# Patient Record
Sex: FEMALE | Race: WHITE | NOT HISPANIC OR LATINO | Employment: OTHER | URBAN - METROPOLITAN AREA
[De-identification: names, ages, dates, MRNs, and addresses within clinical notes are randomized per-mention and may not be internally consistent; named-entity substitution may affect disease eponyms.]

---

## 2017-01-04 ENCOUNTER — TELEPHONE (OUTPATIENT)
Dept: VASCULAR LAB | Facility: MEDICAL CENTER | Age: 77
End: 2017-01-04

## 2017-01-04 ENCOUNTER — OFFICE VISIT (OUTPATIENT)
Dept: CARDIOLOGY | Facility: MEDICAL CENTER | Age: 77
End: 2017-01-04
Payer: MEDICARE

## 2017-01-04 VITALS
OXYGEN SATURATION: 94 % | SYSTOLIC BLOOD PRESSURE: 170 MMHG | BODY MASS INDEX: 33.75 KG/M2 | HEIGHT: 66 IN | DIASTOLIC BLOOD PRESSURE: 76 MMHG | HEART RATE: 65 BPM | WEIGHT: 210 LBS

## 2017-01-04 DIAGNOSIS — I48.0 PAF (PAROXYSMAL ATRIAL FIBRILLATION) (HCC): ICD-10-CM

## 2017-01-04 DIAGNOSIS — I48.91 ATRIAL FIBRILLATION, UNSPECIFIED TYPE (HCC): ICD-10-CM

## 2017-01-04 DIAGNOSIS — I10 ESSENTIAL HYPERTENSION: ICD-10-CM

## 2017-01-04 LAB — EKG IMPRESSION: NORMAL

## 2017-01-04 PROCEDURE — 1036F TOBACCO NON-USER: CPT | Performed by: INTERNAL MEDICINE

## 2017-01-04 PROCEDURE — G8427 DOCREV CUR MEDS BY ELIG CLIN: HCPCS | Performed by: INTERNAL MEDICINE

## 2017-01-04 PROCEDURE — 99214 OFFICE O/P EST MOD 30 MIN: CPT | Performed by: INTERNAL MEDICINE

## 2017-01-04 PROCEDURE — G8419 CALC BMI OUT NRM PARAM NOF/U: HCPCS | Performed by: INTERNAL MEDICINE

## 2017-01-04 PROCEDURE — 93000 ELECTROCARDIOGRAM COMPLETE: CPT | Performed by: INTERNAL MEDICINE

## 2017-01-04 ASSESSMENT — ENCOUNTER SYMPTOMS: PALPITATIONS: 1

## 2017-01-04 NOTE — MR AVS SNAPSHOT
"        Radha Valentino Cocergine   2017 3:20 PM   Office Visit   MRN: [id number]    Department:  [facility]   Dept Phone:  [phone]    Description:  Female : 1940   Provider:  Jairo Loco M.D.           Reason for Visit     Follow-Up           Allergies as of 2017     Allergen Noted Reactions    Kdc:Fentanyl 2009       States drops the b/p very low  PER PT \"NOT TO BE ADMINISTERED-NOT ANY FENTANYL\"    Morphine 2009   Unspecified    Drops blood pressure   PER PT \"NOT TO BE ADMINISTERED\"    Ciprofloxacin 2015   Vomiting    Codeine 2009   Nausea    \"hyper\"    Erythromycin 2014   Vomiting    Penicillins 2009   Rash    Sulfa Drugs 2011   Vomiting, Nausea    RXN=since age 18      You were diagnosed with     PAF (paroxysmal atrial fibrillation) (Formerly [facility])   [113628]       Essential hypertension   [0414755]         Vital Signs     Blood Pressure Pulse Height Weight Body Mass Index Oxygen Saturation    170/76 mmHg 65 1.676 m (5' 5.98\") 95.255 kg (210 lb) 33.91 kg/m2 94%    Last Menstrual Period Smoking Status                1975 Former Smoker          Basic Information     Date Of Birth Sex Race Ethnicity Preferred Language    1940 Female White Non- English      Your appointments     2017  3:20 PM   FOLLOW UP with Jairo Loco M.D.   [facility] (--)    [address], [address]  [place] [id number] [phone]            2017  2:00 PM   Access New To You with Andrea Fernández M.D.   [facility] [address]    [address]  [address]  [place] [id number] [phone]            2017  1:15 PM   FOLLOW UP with Landen Esquivel M.D.   [facility] (--)    [address], [address]  [place] [id number] [phone]              Problem List              ICD-10-CM Priority Class Noted - Resolved    Edema R60.9 High  " 11/15/2011 - Present    Hypothyroidism E03.9   11/15/2011 - Present    Hepatitis-Autoimmune K75.9   2012 - Present    Dyslipidemia E78.5   2012 - Present    Osteoarthritis M19.90   2015 - Present    Obesity (BMI 30-39.9) E66.9   2015 - Present    Essential hypertension I10   10/7/2015 - Present    PAF (paroxysmal atrial fibrillation) (HCC) I48.0   10/21/2015 - Present    Midline low back pain without sciatica M54.5   2015 - Present    Chronic anticoagulation Z79.01   2016 - Present    Lumbar stenosis M48.06   2016 - Present      Health Maintenance        Date Due Completion Dates    PAP SMEAR 1959 ---    IMM ZOSTER VACCINE 1998 ---    IMM PNEUMOCOCCAL 65+ (ADULT) LOW/MEDIUM RISK SERIES (2 of 2 - PPSV23) 11/3/2016 11/3/2015    MAMMOGRAM 3/4/2017 3/4/2016, 2016, 2014, 2011, 2011, 2009, 2009    COLONOSCOPY 2018 (Done)    Override on 2008: Done    BONE DENSITY 2021, 2012, 2009    IMM DTaP/Tdap/Td Vaccine (2 - Td) 2026            Results     EKG      Component    Report    [facility]    Test Date:  2017  Pt Name:    MECCA AMAYA               Department: [facility]  MRN:        [id number]                      Room:  Gender:     F                            Technician: KW  :        1940                   Requested By:JAIRO MORTON  Order #:    [id number]                    Reading MD: Jairo Morton MD    Measurements  Intervals                                Axis  Rate:       54                           P:          67  FL:         172                          QRS:        53  QRSD:       86                           T:          62  QT:         452  QTc:        429    Interpretive Statements  SINUS BRADYCARDIA  PROBABLE LEFT ATRIAL ABNORMALITY  BORDERLINE T ABNORMALITIES, ANT-LAT LEADS  Compared to ECG 2016 10:32:29  No significant changes    Electronically Signed On  1-4-2017 14:50:41 PST by Jairo Loco MD                          Current Immunizations     13-VALENT PCV PREVNAR 11/3/2015  2:20 PM    Hepatitis A Vaccine, Adult 11/5/2008    Hepatitis A Vaccine, Ped/Adol 5/12/2009    Hepatitis B Vaccine Non-Recombivax (Ped/Adol) 5/12/2009    Hepatitis B Vaccine Recombivax (Adol/Adult) 11/5/2008    Influenza TIV (IM) 10/18/2010    Influenza Vaccine Adult HD 10/26/2016, 10/20/2015, 10/13/2014  2:24 PM    Pneumococcal Vaccine (UF)Historical Data 7/18/2007    Tdap Vaccine 1/22/2016      Below and/or attached are the medications your provider expects you to take. Review all of your home medications and newly ordered medications with your provider and/or pharmacist. Follow medication instructions as directed by your provider and/or pharmacist. Please keep your medication list with you and share with your provider. Update the information when medications are discontinued, doses are changed, or new medications (including over-the-counter products) are added; and carry medication information at all times in the event of emergency situations     Allergies:  KDC:FENTANYL - (reactions not documented)     MORPHINE - Unspecified     CIPROFLOXACIN - Vomiting     CODEINE - Nausea     ERYTHROMYCIN - Vomiting     PENICILLINS - Rash     SULFA DRUGS - Vomiting,Nausea               Medications  Valid as of: January 04, 2017 -  3:10 PM    Generic Name Brand Name Tablet Size Instructions for use    AzaTHIOprine (Tab) IMURAN 50 MG Take 1 Tab by mouth every day.        Famotidine (Tab) PEPCID 20 MG Take 20 mg by mouth 2 times a day.        Felodipine (TABLET SR 24 HR) PLENDIL 2.5 MG Take one tablet in the morning and two in the evening.        Hydrocodone-Acetaminophen (Tab) NORCO 5-325 MG Take 1-2 Tabs by mouth every four hours as needed.        Irbesartan (Tab) AVAPRO 150 MG Take 1 Tab by mouth 2 Times a Day.        Levothyroxine Sodium (Tab) SYNTHROID 100 MCG TAKE 1 TAB BY MOUTH EVERY MORNING ON AN  EMPTY STOMACH.        PredniSONE (Tab) DELTASONE 5 MG Take 1 Tab by mouth every day.        Rivaroxaban (Tab) XARELTO 20 MG Take 1 Tab by mouth with dinner.        Sotalol HCl (Tab) BETAPACE 120 MG Take 1 Tab by mouth 2 times a day.        Spironolactone (Tab) ALDACTONE 50 MG Take 1 Tab by mouth every day.        .                 Medicines prescribed today were sent to:     [id number] - [place], [place] - [address]    [address] [facility] [place] [address]    Phone: [phone] Fax: [phone]    Open 24 Hours?: No      Medication refill instructions:       If your prescription bottle indicates you have medication refills left, it is not necessary to call your provider’s office. Please contact your pharmacy and they will refill your medication.    If your prescription bottle indicates you do not have any refills left, you may request refills at any time through one of the following ways: The online [employer] system (except Urgent Care), by calling your provider’s office, or by asking your pharmacy to contact your provider’s office with a refill request. Medication refills are processed only during regular business hours and may not be available until the next business day. Your provider may request additional information or to have a follow-up visit with you prior to refilling your medication.   *Please Note: Medication refills are assigned a new Rx number when refilled electronically. Your pharmacy may indicate that no refills were authorized even though a new prescription for the same medication is available at the pharmacy. Please request the medicine by name with the pharmacy before contacting your provider for a refill.           MyChart Status: Patient Declined

## 2017-01-04 NOTE — PROGRESS NOTES
Subjective:   Radha Caraballo is a 76 y.o. female who presents today for follow up of a fib    She has 2-3 days of intermittent a fib about once a month.     now in hospice.  She is stressing out with that.    No issues  On blood thinner.  No dizziness or lh    High bp here.  This am it was 132/74.  She tells me it is always white coat htn.        Past Medical History   Diagnosis Date   • Autoimmune hepatitis (HCC)    • Heart burn    • Pain      joints,pain scale 5   • Hypertension    • Arthritis    • Indigestion    • Hiatus hernia syndrome    • Unspecified disorder of thyroid      hypothyroid   • CATARACT      surgically corrected   • Arrhythmia      Past Surgical History   Procedure Laterality Date   • Tonsillectomy  1942   • Hysterectomy, vaginal  1975   • Carpal tunnel endo  2004     right   • Cataract ext w/iol  2006,2003     right   • Appendectomy  1958   • Other  1964, 1982     Nathanael Yosts; right   • Cataract phaco with iol  8/5/2009     Performed by NICOLETTE SANTACRUZ at SURGERY SAME DAY [facility]   • Cholecystectomy  1979   • Pr oral surgery procedure  1959   • Liver biopsy  11/2008,2001     autoimmune hepatitis   • Retinal detachment repair  12/99, 11/2004     x2 right; x1 left   • Knee arthroplasty total  7/18/2011     Performed by SEVERINO KULKARNI at SURGERY [facility]   • Lumbar laminectomy diskectomy Left 7/12/2016     Procedure: LUMBAR LAMINECTOMY DISKECTOMY posterior L4-S1;  Surgeon: Jeyson Don M.D.;  Location: SURGERY [facility];  Service:      Family History   Problem Relation Age of Onset   • Hypertension Mother    • Heart Failure Mother    • Hypertension Father    • Heart Failure Father    • Heart Failure Brother 40     CABG   • Heart Failure Brother 50     cabg   • Heart Disease Sister 75     3 stents, valve replacement.     History   Smoking status   • Former Smoker -- 1.00 packs/day for 10 years   • Types: Cigarettes   • Quit date: 10/07/1990   Smokeless  "tobacco   • Former User     Allergies   Allergen Reactions   • Kdc:Fentanyl      States drops the b/p very low  PER PT \"NOT TO BE ADMINISTERED-NOT ANY FENTANYL\"   • Morphine Unspecified     Drops blood pressure   PER PT \"NOT TO BE ADMINISTERED\"   • Ciprofloxacin Vomiting   • Codeine Nausea     \"hyper\"   • Erythromycin Vomiting   • Penicillins Rash   • Sulfa Drugs Vomiting and Nausea     RXN=since age 18     Outpatient Encounter Prescriptions as of 1/4/2017   Medication Sig Dispense Refill   • predniSONE (DELTASONE) 5 MG Tab Take 1 Tab by mouth every day. 30 Tab 0   • spironolactone (ALDACTONE) 50 MG Tab Take 1 Tab by mouth every day. 90 Tab 3   • sotalol (BETAPACE) 120 MG tablet Take 1 Tab by mouth 2 times a day. 180 Tab 3   • hydrocodone-acetaminophen (NORCO) 5-325 MG Tab per tablet Take 1-2 Tabs by mouth every four hours as needed. 120 Tab 0   • rivaroxaban (XARELTO) 20 MG Tab tablet Take 1 Tab by mouth with dinner. 90 Tab 3   • levothyroxine (SYNTHROID) 100 MCG Tab TAKE 1 TAB BY MOUTH EVERY MORNING ON AN EMPTY STOMACH. 90 Tab 1   • famotidine (PEPCID) 20 MG Tab Take 20 mg by mouth 2 times a day.     • irbesartan (AVAPRO) 150 MG Tab Take 1 Tab by mouth 2 Times a Day. 60 Tab 3   • felodipine (PLENDIL) 2.5 MG TABLET SR 24 HR Take one tablet in the morning and two in the evening. 270 Tab 3   • azathioprine (IMURAN) 50 MG TABS Take 1 Tab by mouth every day. 90 Each 3     No facility-administered encounter medications on file as of 1/4/2017.     Review of Systems   Cardiovascular: Positive for palpitations.        Objective:   /76 mmHg  Pulse 65  Ht 1.676 m (5' 5.98\")  Wt 95.255 kg (210 lb)  BMI 33.91 kg/m2  SpO2 94%  LMP 01/01/1975    Physical Exam   Constitutional: She is oriented to person, place, and time. She appears well-developed and well-nourished. No distress.   HENT:   Head: Normocephalic and atraumatic.   Right Ear: External ear normal.   Left Ear: External ear normal.   Mouth/Throat: Oropharynx " is clear and moist.   Eyes: Conjunctivae and EOM are normal. Right eye exhibits no discharge. Left eye exhibits no discharge. No scleral icterus.   Neck: Normal range of motion. Neck supple. No JVD present. No tracheal deviation present. No thyromegaly present.   Cardiovascular: Normal rate, regular rhythm, normal heart sounds and intact distal pulses.  Exam reveals no gallop and no friction rub.    No murmur heard.  Pulmonary/Chest: Effort normal and breath sounds normal. No stridor. No respiratory distress. She has no wheezes. She has no rales.   Abdominal: Soft. She exhibits no distension.   Musculoskeletal: She exhibits no edema or tenderness.   Neurological: She is alert and oriented to person, place, and time. No cranial nerve deficit. Coordination normal.   Skin: Skin is warm and dry. No rash noted. She is not diaphoretic. No erythema. No pallor.   Psychiatric: She has a normal mood and affect. Her behavior is normal. Judgment and thought content normal.   Vitals reviewed.      Assessment:     1. PAF (paroxysmal atrial fibrillation) (HCC)  EKG   2. Essential hypertension  EKG     Cr 1.2  ecg sinus michelle 54 qtc 430  Medical Decision Making:  Today's Assessment / Status / Plan:     paf adequately controlled for now.  Does not desire any change in regimen with all that is going on at current time and the bradycardia.  Continue oac.  With increased cr (still under 1.5) eliquis may be easier to dose because it is based on age, weight  and cr level rather than calculated cr clearance.

## 2017-01-09 DIAGNOSIS — E07.9 THYROID CONDITION: ICD-10-CM

## 2017-01-09 RX ORDER — IRBESARTAN 150 MG/1
TABLET ORAL
Refills: 2 | Status: CANCELLED | OUTPATIENT
Start: 2017-01-09

## 2017-01-09 RX ORDER — FELODIPINE 2.5 MG/1
TABLET, EXTENDED RELEASE ORAL
Refills: 2 | Status: CANCELLED | OUTPATIENT
Start: 2017-01-09

## 2017-01-09 RX ORDER — LEVOTHYROXINE SODIUM 0.1 MG/1
100 TABLET ORAL
Qty: 90 TAB | Refills: 1 | Status: SHIPPED | OUTPATIENT
Start: 2017-01-09

## 2017-01-11 DIAGNOSIS — I10 ESSENTIAL HYPERTENSION: ICD-10-CM

## 2017-01-11 RX ORDER — FELODIPINE 2.5 MG/1
TABLET, EXTENDED RELEASE ORAL
Qty: 270 TAB | Refills: 3 | Status: SHIPPED | OUTPATIENT
Start: 2017-01-11

## 2017-01-11 RX ORDER — IRBESARTAN 150 MG/1
150 TABLET ORAL 2 TIMES DAILY
Qty: 180 TAB | Refills: 3 | Status: SHIPPED | OUTPATIENT
Start: 2017-01-11

## 2017-02-03 ENCOUNTER — OFFICE VISIT (OUTPATIENT)
Dept: MEDICAL GROUP | Facility: MEDICAL CENTER | Age: 77
End: 2017-02-03
Payer: MEDICARE

## 2017-02-03 VITALS
HEIGHT: 66 IN | WEIGHT: 211 LBS | TEMPERATURE: 97.9 F | HEART RATE: 64 BPM | RESPIRATION RATE: 16 BRPM | BODY MASS INDEX: 33.91 KG/M2 | DIASTOLIC BLOOD PRESSURE: 102 MMHG | OXYGEN SATURATION: 95 % | SYSTOLIC BLOOD PRESSURE: 176 MMHG

## 2017-02-03 DIAGNOSIS — E66.9 OBESITY (BMI 30-39.9): ICD-10-CM

## 2017-02-03 DIAGNOSIS — I10 ESSENTIAL HYPERTENSION: ICD-10-CM

## 2017-02-03 DIAGNOSIS — I48.20 CHRONIC ATRIAL FIBRILLATION (HCC): ICD-10-CM

## 2017-02-03 DIAGNOSIS — E03.9 HYPOTHYROIDISM, UNSPECIFIED TYPE: ICD-10-CM

## 2017-02-03 DIAGNOSIS — Z79.891 CHRONIC USE OF OPIATE FOR THERAPEUTIC PURPOSE: ICD-10-CM

## 2017-02-03 DIAGNOSIS — N18.3 CKD (CHRONIC KIDNEY DISEASE), STAGE 3 (MODERATE): ICD-10-CM

## 2017-02-03 DIAGNOSIS — M48.061 LUMBAR STENOSIS: ICD-10-CM

## 2017-02-03 DIAGNOSIS — Z00.00 HEALTH CARE MAINTENANCE: ICD-10-CM

## 2017-02-03 DIAGNOSIS — E78.5 DYSLIPIDEMIA: ICD-10-CM

## 2017-02-03 DIAGNOSIS — K75.9 HEPATITIS: ICD-10-CM

## 2017-02-03 DIAGNOSIS — K21.9 GASTROESOPHAGEAL REFLUX DISEASE WITHOUT ESOPHAGITIS: ICD-10-CM

## 2017-02-03 PROCEDURE — 4040F PNEUMOC VAC/ADMIN/RCVD: CPT | Performed by: FAMILY MEDICINE

## 2017-02-03 PROCEDURE — 99204 OFFICE O/P NEW MOD 45 MIN: CPT | Mod: 25 | Performed by: FAMILY MEDICINE

## 2017-02-03 PROCEDURE — 1036F TOBACCO NON-USER: CPT | Performed by: FAMILY MEDICINE

## 2017-02-03 PROCEDURE — G8482 FLU IMMUNIZE ORDER/ADMIN: HCPCS | Performed by: FAMILY MEDICINE

## 2017-02-03 PROCEDURE — 0518F FALL PLAN OF CARE DOCD: CPT | Mod: 8P | Performed by: FAMILY MEDICINE

## 2017-02-03 PROCEDURE — G8419 CALC BMI OUT NRM PARAM NOF/U: HCPCS | Performed by: FAMILY MEDICINE

## 2017-02-03 PROCEDURE — 1100F PTFALLS ASSESS-DOCD GE2>/YR: CPT | Performed by: FAMILY MEDICINE

## 2017-02-03 PROCEDURE — 3288F FALL RISK ASSESSMENT DOCD: CPT | Performed by: FAMILY MEDICINE

## 2017-02-03 RX ORDER — HYDROCODONE BITARTRATE AND ACETAMINOPHEN 5; 325 MG/1; MG/1
1 TABLET ORAL EVERY 6 HOURS PRN
Qty: 120 TAB | Refills: 0 | Status: SHIPPED | OUTPATIENT
Start: 2017-02-03 | End: 2017-03-09 | Stop reason: SDUPTHER

## 2017-02-03 ASSESSMENT — PATIENT HEALTH QUESTIONNAIRE - PHQ9: CLINICAL INTERPRETATION OF PHQ2 SCORE: 0

## 2017-02-03 NOTE — PROGRESS NOTES
CC: Establish a new PCP.     HPI:  Radha presents today to establish a new PCP. Was a patient of Dr العلي.    Active, and independent with all ADLs. Has the following medical issues:    Essential hypertension, BP today is elevated, however patient denies any headache, chest pain, dizziness, or SOB. She stated she always have  high blood pressure at doctors' office. Her BP this morning at home was 130/73. She has been on Felodipine, Irbesartan, spironolactone.She takes her medication regularly, no side effects.    Chronic atrial fibrillation , she has been asymptomatic. Denies palpitation, chest pain, and SOB. Has been having well controlled HR, and rhythm.Currently on Sotalol, and Xarelto.    Dyslipidemia, she has been tolerating the statin. Denies muscle pain LFTs has been normal. Currently on diet control. Her lipid panel was not checked for a while.    Hypothyroidism, she has been tolerating Levothyroxine, no palpitation, no cold or heat intolerance. He is on levothyroxine 100 mg daily. TSh was not checked for more than a year.    Hepatitis-Autoimmune, she has been stable, and asymptomatic. Has been having normal LFTs.She is on Imuran, and prednisone.Follows up with GI regularly.    H/O lumbar spinal stenosis, underwent surgical repair in 2012. However the pain gets worse after surgery.Pain has been controlled on hydrocodone 5 mg, 4 times daily ( 20 mg per day).  was checked, patient has been taking her medication from the same provider.She is due for urine screening test.    Chronic kidney disease, stage 3, her last eGFR was 43, however has normal Cr 1.2.has been asymptomatic. Patient advised to drink more fluids, and avoid nephrotoxic medication. Will continue follow up kidney functions.    Obesity, I is 34.06.Discussed with the patient the medical rationale for weight loss in obese individuals is that obesity is associated with a significant increase in mortality, and many health risks including type 2  diabetes mellitus, hypertension, dyslipidemia, and coronary heart disease. The benefits of weight loss include a reduction in the rate of progression from impaired glucose tolerance to diabetes, blood pressure in hypertensive patients, and lipid levels in higher risk patients. Other noncardiac benefits of weight loss include reductions in urinary incontinence, sleep apnea, and depression, and improvements in quality of life, physical functioning, and mobility.Recommend lifestyle modification: exercise 30 minutes per day 5 days per week. Recommend also portion control.    Gastroesophageal reflux disease , she has been asymptomatic. Denies epigastric pain, and heart burn. Has been doing fine on Famotidine 20 mg daily.    Had colonoscopy when she was 69 yo , showed benign polyps, wants to do the FIT test.    Patient Active Problem List    Diagnosis Date Noted   • Edema 11/15/2011     Priority: High   • Lumbar stenosis 07/12/2016   • Chronic anticoagulation 06/23/2016   • Midline low back pain without sciatica 12/17/2015   • PAF (paroxysmal atrial fibrillation) ([facility]) 10/21/2015   • Essential hypertension 10/07/2015   • Obesity (BMI 30-39.9) 05/11/2015   • Osteoarthritis 01/09/2015   • Dyslipidemia 06/07/2012   • Hepatitis-Autoimmune 05/22/2012   • Hypothyroidism 11/15/2011       Current Outpatient Prescriptions   Medication Sig Dispense Refill   • felodipine (PLENDIL) 2.5 MG TABLET SR 24 HR Take one tablet in the morning and two in the evening. 270 Tab 3   • irbesartan (AVAPRO) 150 MG Tab Take 1 Tab by mouth 2 Times a Day. 180 Tab 3   • levothyroxine (SYNTHROID) 100 MCG Tab Take 1 Tab by mouth every morning before breakfast. 90 Tab 1   • predniSONE (DELTASONE) 5 MG Tab Take 1 Tab by mouth every day. 30 Tab 0   • spironolactone (ALDACTONE) 50 MG Tab Take 1 Tab by mouth every day. 90 Tab 3   • sotalol (BETAPACE) 120 MG tablet Take 1 Tab by mouth 2 times a day. 180 Tab 3   • hydrocodone-acetaminophen (NORCO) 5-325 MG  Tab per tablet Take 1-2 Tabs by mouth every four hours as needed. 120 Tab 0   • rivaroxaban (XARELTO) 20 MG Tab tablet Take 1 Tab by mouth with dinner. 90 Tab 3   • famotidine (PEPCID) 20 MG Tab Take 20 mg by mouth 2 times a day.     • azathioprine (IMURAN) 50 MG TABS Take 1 Tab by mouth every day. 90 Each 3     No current facility-administered medications for this visit.         Allergies as of 02/03/2017 - Dell as Reviewed 02/03/2017   Allergen Reaction Noted   • Kdc:fentanyl  08/05/2009   • Morphine Unspecified 07/31/2009   • Ciprofloxacin Vomiting 08/13/2015   • Codeine Nausea 07/31/2009   • Erythromycin Vomiting 06/02/2014   • Penicillins Rash 07/31/2009   • Sulfa drugs Vomiting and Nausea 07/18/2011        Social History     Social History   • Marital Status:      Spouse Name: N/A   • Number of Children: N/A   • Years of Education: N/A     Occupational History   • Not on file.     Social History Main Topics   • Smoking status: Former Smoker -- 1.00 packs/day for 10 years     Types: Cigarettes     Quit date: 10/07/1990   • Smokeless tobacco: Former User   • Alcohol Use: No   • Drug Use: No   • Sexual Activity: Yes     Other Topics Concern   • Not on file     Social History Narrative       Family History   Problem Relation Age of Onset   • Hypertension Mother    • Heart Failure Mother    • Hypertension Father    • Heart Failure Father    • Heart Failure Brother 40     CABG   • Heart Failure Brother 50     cabg   • Heart Disease Sister 75     3 stents, valve replacement.       Past Surgical History   Procedure Laterality Date   • Tonsillectomy  1942   • Hysterectomy, vaginal  1975   • Carpal tunnel endo  2004     right   • Cataract ext w/iol  2006,2003     right   • Appendectomy  1958   • Other  1964, 1982     Nathanael Heath; right   • Cataract phaco with iol  8/5/2009     Performed by NICOLETTE SANTACRUZ at SURGERY SAME DAY [facility] ORS   • Cholecystectomy  1979   • Pr oral surgery procedure  1959   •  "Liver biopsy  11/2008,2001     autoimmune hepatitis   • Retinal detachment repair  12/99, 11/2004     x2 right; x1 left   • Knee arthroplasty total  7/18/2011     Performed by SEVERINO KULKARNI at SURGERY [facility]   • Lumbar laminectomy diskectomy Left 7/12/2016     Procedure: LUMBAR LAMINECTOMY DISKECTOMY posterior L4-S1;  Surgeon: Jeyson Don M.D.;  Location: SURGERY [facility];  Service:        ROS:  Denies any Headache, Blurred Vision, Confusion Chest pain,  Shortness of breath,  Abdominal pain, Changes of bowel or bladder, Lower ext edema, Fevers, Nights sweats, Weight Changes, Focal weakness or numbness.  All other systems are negative.    /102 mmHg  Pulse 64  Temp(Src) 36.6 °C (97.9 °F)  Resp 16  Ht 1.676 m (5' 6\")  Wt 95.709 kg (211 lb)  BMI 34.07 kg/m2  SpO2 95%  LMP 01/01/1975    Physical Exam:  Gen:         Alert and oriented, No apparent distress.  HEENT:   Perrla, TM clear,  Oralpharynx no erythema or exudates.  Neck:       No Jugular venous distension, Lymphadenopathy, Thyromegaly, Bruits.  Lungs:     Clear to auscultation bilaterally  CV:          Regular rate and rhythm. No murmurs, rubs or gallops.  Abd:         Soft non tender, non distended. Normal active bowel sounds. No hepatosplenomegaly, No pulsatile masses.  Ext:          No clubbing, cyanosis, edema.      Assessment and Plan.   76 y.o. female     1. Essential hypertension  Elevated.   Patient stated she always has white coat high blood pressure. Her BP this morning at home was 130/73. Denies headache, chest pain, and SOB.  Has been on Felodipine, Irbesartan, spironolactone.  Advised to check her BP 3 times daily, for 1 week, and send me the BP log for reevaluation.    2. Chronic atrial fibrillation (CMS-HCC)  Has been asymptomatic. Well controlled HR, and rhythm.  Continue on Sotalol, and Xarelto.    3. Dyslipidemia  He has been tolerating the statin. Denies muscle pain LFTs has been normal  Continue on diet " control. Lipid was not checked for a while.    4. Hypothyroidism, unspecified type  He has been tolerating Levothyroxine, no palpitation, no cold or heat intolerance  Continue on levothyroxine 100 mg daily. TSh was not checked for more than a year.    - TSH+FREE T4    5. Hepatitis-Autoimmune  Has been stable.   Continue on Imuran, and prednisone.  Continue on follow up with GI    6. Lumbar stenosis  Chronic.S/P surgical repair in 2012. However the pain gets worse after surgery.  Pain has been controlled on hydrocodone 5 mg 4 times daily.  was checked, patient has been taking his medication form the same provider.  Due for urine screening test, ordered.    - CONSENT RISKS OF CONTROL SUBST  - hydrocodone-acetaminophen (NORCO) 5-325 MG Tab per tablet; Take 1 Tab by mouth every 6 hours as needed.  Dispense: 120 Tab; Refill: 0  - [facility]NA[facility] SCREEN; Future    7. Health care maintenance  Had colonoscopy when she was 67 yo , showed benign polyps, wants to do the FIT test.    - OCCULT BLOOD FECES IMMUNOASSAY; Future    8. CKD (chronic kidney disease), stage 3 (moderate)  Last eGFR was 43, however has normal Cr 1.2.  Patient advised to drink more fluids, and avoid nephrotoxic medication. Will continue follow up kidney functions.    9. Obesity (BMI 30-39.9)  BMI is 34.06.  Patient is counseled about life style modification( age appropriate exercise, and diet).    - Patient identified as having weight management issue.  Appropriate orders and counseling given.    10. Chronic use of opiate for therapeutic purpose  For Chronic back pain ( lumber stenosis, s/p surgical repair). Has been on hydrocodone 5 mg 4 times daily.  was checked, patient has been taking his medication form the same provider.  Due for urine screening test, ordered.    - CONSENT RISKS OF CONTROL SUBST  - hydrocodone-acetaminophen (NORCO) 5-325 MG Tab per tablet; Take 1 Tab by mouth every 6 hours as needed.  Dispense: 120 Tab; Refill: 0  -  [facility] PAIN MANAGEMENT SCREEN; Future    11. Gastroesophageal reflux disease without esophagitis  Stable, asymptomatic.  Continue on Famotidine 20 mg daily.

## 2017-02-03 NOTE — MR AVS SNAPSHOT
"        Radha Valentino Cocergine   2/3/2017 2:00 PM   Office Visit   MRN: [id number]    Department:  [address] [place] [address]   Dept Phone:  [phone]    Description:  Female : 1940   Provider:  Andrea Fernández M.D.           Reason for Visit     New Patient establish      Allergies as of 2/3/2017     Allergen Noted Reactions    Kdc:Fentanyl 2009       States drops the b/p very low  PER PT \"NOT TO BE ADMINISTERED-NOT ANY FENTANYL\"    Morphine 2009   Unspecified    Drops blood pressure   PER PT \"NOT TO BE ADMINISTERED\"    Ciprofloxacin 2015   Vomiting    Codeine 2009   Nausea    \"hyper\"    Erythromycin 2014   Vomiting    Penicillins 2009   Rash    Sulfa Drugs 2011   Vomiting, Nausea    RXN=since age 18      You were diagnosed with     Essential hypertension   [1787363]       Chronic atrial fibrillation (CMS-HCC)   [684292]       Dyslipidemia   [481425]       Hypothyroidism, unspecified type   [1615787]       Hepatitis   [382398]       Lumbar stenosis   [287838]       Health care maintenance   [594987]       CKD (chronic kidney disease), stage 3 (moderate)   [3905650]       Obesity (BMI 30-39.9)   [393558]       Chronic use of opiate for therapeutic purpose   [5585855]       Gastroesophageal reflux disease without esophagitis   [336843]         Vital Signs     Blood Pressure Pulse Temperature Respirations Height Weight    176/102 mmHg 64 36.6 °C (97.9 °F) 16 1.676 m (5' 6\") 95.709 kg (211 lb)    Body Mass Index Oxygen Saturation Last Menstrual Period Smoking Status          34.07 kg/m2 95% 1975 Former Smoker        Basic Information     Date Of Birth Sex Race Ethnicity Preferred Language    1940 Female White Non- English      Your appointments     2017  1:15 PM   FOLLOW UP with Landen Esquivel M.D.   [facility] (--)    [address], [address]  [place] [id number] [phone]            , " 2017  2:20 PM   FOLLOW UP with Jairo Loco M.D.   [facility] (--)    [address], [address]  [place] [address] [phone]            Apr 28, 2017 11:00 AM   Established Patient with Andrea Fernández M.D.   [facility] [address]    [address]  [address]  [place] [address]   [phone]           You will be receiving a confirmation call a few days before your appointment from our automated call confirmation system.              Problem List              ICD-10-CM Priority Class Noted - Resolved    Edema R60.9 High  11/15/2011 - Present    Hypothyroidism E03.9   11/15/2011 - Present    Hepatitis-Autoimmune K75.9   5/22/2012 - Present    Dyslipidemia E78.5   6/7/2012 - Present    Osteoarthritis M19.90   1/9/2015 - Present    Obesity (BMI 30-39.9) E66.9   5/11/2015 - Present    Essential hypertension I10   10/7/2015 - Present    PAF (paroxysmal atrial fibrillation) (CMS-HCC) I48.0   10/21/2015 - Present    Midline low back pain without sciatica M54.5   12/17/2015 - Present    Chronic anticoagulation Z79.01   6/23/2016 - Present    Lumbar stenosis M48.06   7/12/2016 - Present      Health Maintenance        Date Due Completion Dates    PAP SMEAR 6/21/1959 ---    IMM ZOSTER VACCINE 6/21/1998 ---    IMM PNEUMOCOCCAL 65+ (ADULT) LOW/MEDIUM RISK SERIES (2 of 2 - PPSV23) 11/3/2016 11/3/2015    MAMMOGRAM 3/4/2017 3/4/2016, 2/22/2016, 9/18/2014, 8/18/2011, 4/1/2009, 4/1/2009    COLONOSCOPY 9/11/2018 9/11/2008 (Done)    Override on 9/11/2008: Done    BONE DENSITY 2/22/2021 2/22/2016, 8/8/2012, 8/25/2009    IMM DTaP/Tdap/Td Vaccine (2 - Td) 1/22/2026 1/22/2016            Current Immunizations     13-VALENT PCV PREVNAR 11/3/2015  2:20 PM    Hepatitis A Vaccine, Adult 11/5/2008    Hepatitis A Vaccine, Ped/Adol 5/12/2009    Hepatitis B Vaccine Non-Recombivax (Ped/Adol) 5/12/2009    Hepatitis B Vaccine Recombivax (Adol/Adult) 11/5/2008    Influenza  TIV (IM) 10/18/2010    Influenza Vaccine Adult HD 10/26/2016, 10/20/2015, 10/13/2014  2:24 PM    Pneumococcal Vaccine (UF)Historical Data 7/18/2007    Tdap Vaccine 1/22/2016      Below and/or attached are the medications your provider expects you to take. Review all of your home medications and newly ordered medications with your provider and/or pharmacist. Follow medication instructions as directed by your provider and/or pharmacist. Please keep your medication list with you and share with your provider. Update the information when medications are discontinued, doses are changed, or new medications (including over-the-counter products) are added; and carry medication information at all times in the event of emergency situations     Allergies:  KDC:FENTANYL - (reactions not documented)     MORPHINE - Unspecified     CIPROFLOXACIN - Vomiting     CODEINE - Nausea     ERYTHROMYCIN - Vomiting     PENICILLINS - Rash     SULFA DRUGS - Vomiting,Nausea               Medications  Valid as of: February 03, 2017 -  2:50 PM    Generic Name Brand Name Tablet Size Instructions for use    AzaTHIOprine (Tab) IMURAN 50 MG Take 1 Tab by mouth every day.        Famotidine (Tab) PEPCID 20 MG Take 20 mg by mouth 2 times a day.        Felodipine (TABLET SR 24 HR) PLENDIL 2.5 MG Take one tablet in the morning and two in the evening.        Hydrocodone-Acetaminophen (Tab) NORCO 5-325 MG Take 1 Tab by mouth every 6 hours as needed.        Irbesartan (Tab) AVAPRO 150 MG Take 1 Tab by mouth 2 Times a Day.        Levothyroxine Sodium (Tab) SYNTHROID 100 MCG Take 1 Tab by mouth every morning before breakfast.        PredniSONE (Tab) DELTASONE 5 MG Take 1 Tab by mouth every day.        Rivaroxaban (Tab) XARELTO 20 MG Take 1 Tab by mouth with dinner.        Sotalol HCl (Tab) BETAPACE 120 MG Take 1 Tab by mouth 2 times a day.        Spironolactone (Tab) ALDACTONE 50 MG Take 1 Tab by mouth every day.        .                 Medicines prescribed  today were sent to:     [id number] - [place], [place] - [address]    [address] [place] [address]    Phone: [phone] Fax: [phone]    Open 24 Hours?: No      Medication refill instructions:       If your prescription bottle indicates you have medication refills left, it is not necessary to call your provider’s office. Please contact your pharmacy and they will refill your medication.    If your prescription bottle indicates you do not have any refills left, you may request refills at any time through one of the following ways: The online [employer] system (except Urgent Care), by calling your provider’s office, or by asking your pharmacy to contact your provider’s office with a refill request. Medication refills are processed only during regular business hours and may not be available until the next business day. Your provider may request additional information or to have a follow-up visit with you prior to refilling your medication.   *Please Note: Medication refills are assigned a new Rx number when refilled electronically. Your pharmacy may indicate that no refills were authorized even though a new prescription for the same medication is available at the pharmacy. Please request the medicine by name with the pharmacy before contacting your provider for a refill.        Your To Do List     Future Labs/Procedures Complete By [employer] PAIN MANAGEMENT SCREEN  As directed 2/3/2018    Comments:    Current Meds (name, sig, last dose):   Current outpatient prescriptions:   •  hydrocodone-acetaminophen, 1 Tab, Oral, Q6HRS PRN  •  felodipine, Take one tablet in the morning and two in the evening., 2/3/2017  •  irbesartan, 150 mg, Oral, BID, 2/3/2017  •  levothyroxine, 100 mcg, Oral, QAM AC, 2/3/2017  •  predniSONE, 5 mg, Oral, DAILY, 2/3/2017  •  spironolactone, 50 mg, Oral, DAILY, 2/3/2017  •  sotalol, 120 mg, Oral, BID, 2/3/2017  •  rivaroxaban, 20 mg, Oral, PM MEAL, 2/3/2017  •   famotidine, 20 mg, Oral, BID, 2/3/2017  •  azathioprine, 50 mg, Oral, DAILY, 1/4/2017          OCCULT BLOOD FECES IMMUNOASSAY  As directed 2/3/2018         MyChart Status: Patient Declined

## 2017-02-07 ENCOUNTER — OFFICE VISIT (OUTPATIENT)
Dept: CARDIOLOGY | Facility: MEDICAL CENTER | Age: 77
End: 2017-02-07
Payer: MEDICARE

## 2017-02-07 VITALS
BODY MASS INDEX: 33.75 KG/M2 | SYSTOLIC BLOOD PRESSURE: 130 MMHG | OXYGEN SATURATION: 96 % | WEIGHT: 210 LBS | HEART RATE: 60 BPM | HEIGHT: 66 IN | DIASTOLIC BLOOD PRESSURE: 90 MMHG

## 2017-02-07 DIAGNOSIS — I48.0 PAF (PAROXYSMAL ATRIAL FIBRILLATION) (HCC): ICD-10-CM

## 2017-02-07 DIAGNOSIS — Z79.899 ENCOUNTER FOR MONITORING SOTALOL THERAPY: Chronic | ICD-10-CM

## 2017-02-07 DIAGNOSIS — Z79.01 CHRONIC ANTICOAGULATION: ICD-10-CM

## 2017-02-07 DIAGNOSIS — Z51.81 ENCOUNTER FOR MONITORING SOTALOL THERAPY: Chronic | ICD-10-CM

## 2017-02-07 DIAGNOSIS — E78.5 DYSLIPIDEMIA: ICD-10-CM

## 2017-02-07 PROCEDURE — 4040F PNEUMOC VAC/ADMIN/RCVD: CPT | Performed by: INTERNAL MEDICINE

## 2017-02-07 PROCEDURE — G8482 FLU IMMUNIZE ORDER/ADMIN: HCPCS | Performed by: INTERNAL MEDICINE

## 2017-02-07 PROCEDURE — 1100F PTFALLS ASSESS-DOCD GE2>/YR: CPT | Performed by: INTERNAL MEDICINE

## 2017-02-07 PROCEDURE — 0518F FALL PLAN OF CARE DOCD: CPT | Mod: 8P | Performed by: INTERNAL MEDICINE

## 2017-02-07 PROCEDURE — 1036F TOBACCO NON-USER: CPT | Performed by: INTERNAL MEDICINE

## 2017-02-07 PROCEDURE — G8419 CALC BMI OUT NRM PARAM NOF/U: HCPCS | Performed by: INTERNAL MEDICINE

## 2017-02-07 PROCEDURE — 3288F FALL RISK ASSESSMENT DOCD: CPT | Performed by: INTERNAL MEDICINE

## 2017-02-07 PROCEDURE — G8432 DEP SCR NOT DOC, RNG: HCPCS | Performed by: INTERNAL MEDICINE

## 2017-02-07 PROCEDURE — 99214 OFFICE O/P EST MOD 30 MIN: CPT | Performed by: INTERNAL MEDICINE

## 2017-02-07 NOTE — MR AVS SNAPSHOT
"        Radha Valentino Cocergine   2017 1:15 PM   Office Visit   MRN: [id number]    Department:  [facility]   Dept Phone:  [phone]    Description:  Female : 1940   Provider:  Landen Esquivel M.D.           Reason for Visit     Follow-Up           Allergies as of 2017     Allergen Noted Reactions    Kdc:Fentanyl 2009       States drops the b/p very low  PER PT \"NOT TO BE ADMINISTERED-NOT ANY FENTANYL\"    Morphine 2009   Unspecified    Drops blood pressure   PER PT \"NOT TO BE ADMINISTERED\"    Ciprofloxacin 2015   Vomiting    Codeine 2009   Nausea    \"hyper\"    Erythromycin 2014   Vomiting    Penicillins 2009   Rash    Sulfa Drugs 2011   Vomiting, Nausea    RXN=since age 18      You were diagnosed with     Dyslipidemia   [412234]       Chronic anticoagulation   [270331]       PAF (paroxysmal atrial fibrillation) (CMS-[facility])   [810296]         Vital Signs     Blood Pressure Pulse Height Weight Body Mass Index Oxygen Saturation    130/90 mmHg 60 1.676 m (5' 5.98\") 95.255 kg (210 lb) 33.91 kg/m2 96%    Last Menstrual Period Smoking Status                1975 Former Smoker          Basic Information     Date Of Birth Sex Race Ethnicity Preferred Language    1940 Female White Non- English      Your appointments     2017  2:20 PM   FOLLOW UP with Jairo Loco M.D.   [facility] (--)    [address], [address]  [place] [id number] [phone]            2017 11:00 AM   Established Patient with Andrea Fernández M.D.   [facility] [address]    [address]  [address]  [place] [address] [phone]           You will be receiving a confirmation call a few days before your appointment from our automated call confirmation system.              Problem List              ICD-10-CM Priority Class Noted - Resolved    Edema R60.9 High  11/15/2011 - Present   " Hypothyroidism E03.9   11/15/2011 - Present    Hepatitis-Autoimmune K75.9   5/22/2012 - Present    Dyslipidemia E78.5   6/7/2012 - Present    Osteoarthritis M19.90   1/9/2015 - Present    Obesity (BMI 30-39.9) E66.9   5/11/2015 - Present    Essential hypertension I10   10/7/2015 - Present    PAF (paroxysmal atrial fibrillation) (CMS-HCC) I48.0   10/21/2015 - Present    Midline low back pain without sciatica M54.5   12/17/2015 - Present    Chronic anticoagulation Z79.01   6/23/2016 - Present    Lumbar stenosis M48.06   7/12/2016 - Present      Health Maintenance        Date Due Completion Dates    PAP SMEAR 6/21/1959 ---    IMM ZOSTER VACCINE 6/21/1998 ---    IMM PNEUMOCOCCAL 65+ (ADULT) LOW/MEDIUM RISK SERIES (2 of 2 - PPSV23) 11/3/2016 11/3/2015    MAMMOGRAM 3/4/2017 3/4/2016, 2/22/2016, 9/18/2014, 8/18/2011, 4/1/2009, 4/1/2009    COLONOSCOPY 9/11/2018 9/11/2008 (Done)    Override on 9/11/2008: Done    BONE DENSITY 2/22/2021 2/22/2016, 8/8/2012, 8/25/2009    IMM DTaP/Tdap/Td Vaccine (2 - Td) 1/22/2026 1/22/2016            Current Immunizations     13-VALENT PCV PREVNAR 11/3/2015  2:20 PM    Hepatitis A Vaccine, Adult 11/5/2008    Hepatitis A Vaccine, Ped/Adol 5/12/2009    Hepatitis B Vaccine Non-Recombivax (Ped/Adol) 5/12/2009    Hepatitis B Vaccine Recombivax (Adol/Adult) 11/5/2008    Influenza TIV (IM) 10/18/2010    Influenza Vaccine Adult HD 10/26/2016, 10/20/2015, 10/13/2014  2:24 PM    Pneumococcal Vaccine (UF)Historical Data 7/18/2007    Tdap Vaccine 1/22/2016      Below and/or attached are the medications your provider expects you to take. Review all of your home medications and newly ordered medications with your provider and/or pharmacist. Follow medication instructions as directed by your provider and/or pharmacist. Please keep your medication list with you and share with your provider. Update the information when medications are discontinued, doses are changed, or new medications (including  over-the-counter products) are added; and carry medication information at all times in the event of emergency situations     Allergies:  KDC:FENTANYL - (reactions not documented)     MORPHINE - Unspecified     CIPROFLOXACIN - Vomiting     CODEINE - Nausea     ERYTHROMYCIN - Vomiting     PENICILLINS - Rash     SULFA DRUGS - Vomiting,Nausea               Medications  Valid as of: February 07, 2017 -  2:16 PM    Generic Name Brand Name Tablet Size Instructions for use    AzaTHIOprine (Tab) IMURAN 50 MG Take 1 Tab by mouth every day.        Famotidine (Tab) PEPCID 20 MG Take 20 mg by mouth 2 times a day.        Felodipine (TABLET SR 24 HR) PLENDIL 2.5 MG Take one tablet in the morning and two in the evening.        Hydrocodone-Acetaminophen (Tab) NORCO 5-325 MG Take 1 Tab by mouth every 6 hours as needed.        Irbesartan (Tab) AVAPRO 150 MG Take 1 Tab by mouth 2 Times a Day.        Levothyroxine Sodium (Tab) SYNTHROID 100 MCG Take 1 Tab by mouth every morning before breakfast.        PredniSONE (Tab) DELTASONE 5 MG Take 1 Tab by mouth every day.        Rivaroxaban (Tab) XARELTO 20 MG Take 1 Tab by mouth with dinner.        Sotalol HCl (Tab) BETAPACE 120 MG Take 1 Tab by mouth 2 times a day.        Spironolactone (Tab) ALDACTONE 50 MG Take 1 Tab by mouth every day.        .                 Medicines prescribed today were sent to:     [id number] - [place], [place] - [address]    [address] [facility] [place] [address]    Phone: [phone] Fax: [phone]    Open 24 Hours?: No      Medication refill instructions:       If your prescription bottle indicates you have medication refills left, it is not necessary to call your provider’s office. Please contact your pharmacy and they will refill your medication.    If your prescription bottle indicates you do not have any refills left, you may request refills at any time through one of the following ways: The online [employer] system (except Urgent Care), by calling  your provider’s office, or by asking your pharmacy to contact your provider’s office with a refill request. Medication refills are processed only during regular business hours and may not be available until the next business day. Your provider may request additional information or to have a follow-up visit with you prior to refilling your medication.   *Please Note: Medication refills are assigned a new Rx number when refilled electronically. Your pharmacy may indicate that no refills were authorized even though a new prescription for the same medication is available at the pharmacy. Please request the medicine by name with the pharmacy before contacting your provider for a refill.           MyChart Status: Patient Declined

## 2017-02-14 ENCOUNTER — TELEPHONE (OUTPATIENT)
Dept: VASCULAR LAB | Facility: MEDICAL CENTER | Age: 77
End: 2017-02-14

## 2017-02-14 NOTE — TELEPHONE ENCOUNTER
2/14/2017    Pending colonoscopy. Dr. Padilla requesting to hold Xarelto x 72 hours. OK to hold faxed to [facility].    Galo Mcwilliams, PHARMD

## 2017-02-16 PROBLEM — Z51.81 ENCOUNTER FOR MONITORING SOTALOL THERAPY: Chronic | Status: ACTIVE | Noted: 2017-02-16

## 2017-02-16 PROBLEM — Z79.899 ENCOUNTER FOR MONITORING SOTALOL THERAPY: Chronic | Status: ACTIVE | Noted: 2017-02-16

## 2017-02-16 ASSESSMENT — ENCOUNTER SYMPTOMS
NAUSEA: 0
SHORTNESS OF BREATH: 0
COUGH: 0
BRUISES/BLEEDS EASILY: 0
PND: 0
WEAKNESS: 0
BLURRED VISION: 0
FEVER: 0
LOSS OF CONSCIOUSNESS: 0
ABDOMINAL PAIN: 0
SORE THROAT: 0
FOCAL WEAKNESS: 0
PALPITATIONS: 0
HEADACHES: 0
CHILLS: 0
CLAUDICATION: 0
DIZZINESS: 0
FALLS: 0

## 2017-02-17 NOTE — PROGRESS NOTES
Subjective:   Radha Caraballo is a 76 y.o. female who presents today for follow-up of her history of hypertension and family history of heart disease with paroxysmal atrial fibrillation    She's been doing well on anticoagulation and rhythm control    Past Medical History   Diagnosis Date   • Autoimmune hepatitis (CMS-HCC)    • Heart burn    • Pain      joints,pain scale 5   • Hypertension    • Arthritis    • Indigestion    • Hiatus hernia syndrome    • Unspecified disorder of thyroid      hypothyroid   • CATARACT      surgically corrected   • Arrhythmia    • PAF (paroxysmal atrial fibrillation) (CMS-HCC) 10/21/2015     Past Surgical History   Procedure Laterality Date   • Tonsillectomy  1942   • Hysterectomy, vaginal  1975   • Carpal tunnel endo  2004     right   • Cataract ext w/iol  2006,2003     right   • Appendectomy  1958   • Other  1964, 1982     Nathanael Heath; right   • Cataract phaco with iol  8/5/2009     Performed by NICOLETTE SANTACRUZ at SURGERY SAME DAY [facility]   • Cholecystectomy  1979   • Pr oral surgery procedure  1959   • Liver biopsy  11/2008,2001     autoimmune hepatitis   • Retinal detachment repair  12/99, 11/2004     x2 right; x1 left   • Knee arthroplasty total  7/18/2011     Performed by SEVERINO KULKARNI at SURGERY [facility]   • Lumbar laminectomy diskectomy Left 7/12/2016     Procedure: LUMBAR LAMINECTOMY DISKECTOMY posterior L4-S1;  Surgeon: Jeyson Don M.D.;  Location: SURGERY [facility];  Service:      Family History   Problem Relation Age of Onset   • Hypertension Mother    • Heart Failure Mother    • Hypertension Father    • Heart Failure Father    • Heart Failure Brother 40     CABG   • Heart Failure Brother 50     cabg   • Heart Disease Sister 75     3 stents, valve replacement.     History   Smoking status   • Former Smoker -- 1.00 packs/day for 10 years   • Types: Cigarettes   • Quit date: 10/07/1990   Smokeless tobacco   • Former User     Allergies  "  Allergen Reactions   • Kd:Fentanyl      States drops the b/p very low  PER PT \"NOT TO BE ADMINISTERED-NOT ANY FENTANYL\"   • Morphine Unspecified     Drops blood pressure   PER PT \"NOT TO BE ADMINISTERED\"   • Ciprofloxacin Vomiting   • Codeine Nausea     \"hyper\"   • Erythromycin Vomiting   • Penicillins Rash   • Sulfa Drugs Vomiting and Nausea     RXN=since age 18     Outpatient Encounter Prescriptions as of 2/7/2017   Medication Sig Dispense Refill   • hydrocodone-acetaminophen (NORCO) 5-325 MG Tab per tablet Take 1 Tab by mouth every 6 hours as needed. 120 Tab 0   • felodipine (PLENDIL) 2.5 MG TABLET SR 24 HR Take one tablet in the morning and two in the evening. 270 Tab 3   • irbesartan (AVAPRO) 150 MG Tab Take 1 Tab by mouth 2 Times a Day. 180 Tab 3   • levothyroxine (SYNTHROID) 100 MCG Tab Take 1 Tab by mouth every morning before breakfast. 90 Tab 1   • predniSONE (DELTASONE) 5 MG Tab Take 1 Tab by mouth every day. 30 Tab 0   • spironolactone (ALDACTONE) 50 MG Tab Take 1 Tab by mouth every day. 90 Tab 3   • sotalol (BETAPACE) 120 MG tablet Take 1 Tab by mouth 2 times a day. 180 Tab 3   • rivaroxaban (XARELTO) 20 MG Tab tablet Take 1 Tab by mouth with dinner. 90 Tab 3   • famotidine (PEPCID) 20 MG Tab Take 20 mg by mouth 2 times a day.     • azathioprine (IMURAN) 50 MG TABS Take 1 Tab by mouth every day. 90 Each 3     No facility-administered encounter medications on file as of 2/7/2017.     Review of Systems   Constitutional: Negative for fever and chills.   HENT: Negative for sore throat.    Eyes: Negative for blurred vision.   Respiratory: Negative for cough and shortness of breath.    Cardiovascular: Negative for chest pain, palpitations, claudication, leg swelling and PND.   Gastrointestinal: Negative for nausea and abdominal pain.   Musculoskeletal: Negative for falls.   Skin: Negative for rash.   Neurological: Negative for dizziness, focal weakness, loss of consciousness, weakness and headaches. " "  Endo/Heme/Allergies: Does not bruise/bleed easily.        Objective:   /90 mmHg  Pulse 60  Ht 1.676 m (5' 5.98\")  Wt 95.255 kg (210 lb)  BMI 33.91 kg/m2  SpO2 96%  LMP 01/01/1975    Physical Exam   Constitutional: No distress.   HENT:   Mouth/Throat: Oropharynx is clear and moist.   Eyes: No scleral icterus.   Cardiovascular: Normal rate, regular rhythm and intact distal pulses.  Exam reveals no gallop and no friction rub.    No murmur heard.  Pulmonary/Chest: Effort normal and breath sounds normal. She has no rales.   Abdominal: Bowel sounds are normal. There is no tenderness.   Musculoskeletal: She exhibits no edema.   Neurological: She is alert.   Skin: No rash noted. She is not diaphoretic.   Psychiatric: She has a normal mood and affect.     Kidney function has been stable on antiarrhythmics      Assessment:     1. Dyslipidemia     2. Chronic anticoagulation     3. PAF (paroxysmal atrial fibrillation) (CMS-HCC)         Medical Decision Making:  Today's Assessment / Status / Plan:     It was my pleasure to meet with Ms. Caraballo.    Kidney function is good on her sotalol    Blood pressure is better controlled than prior    Tolerating anticoagulation well    I will see Ms. Caraballo back in 1 year time and encouraged her to follow up with us over the phone or e-mail using my MyChart as issues arise.    It is my pleasure to participate in the care of Ms. Caraballo.  Please do not hesitate to contact me with questions or concerns.    Landen Esquivel MD PhD FAC  Cardiologist [facility]    "

## 2017-03-09 DIAGNOSIS — Z79.891 CHRONIC USE OF OPIATE FOR THERAPEUTIC PURPOSE: ICD-10-CM

## 2017-03-09 DIAGNOSIS — M48.061 LUMBAR STENOSIS: ICD-10-CM

## 2017-03-09 RX ORDER — HYDROCODONE BITARTRATE AND ACETAMINOPHEN 5; 325 MG/1; MG/1
1 TABLET ORAL EVERY 6 HOURS PRN
Qty: 120 TAB | Refills: 0 | Status: SHIPPED | OUTPATIENT
Start: 2017-03-09 | End: 2017-04-06 | Stop reason: SDUPTHER

## 2017-03-09 NOTE — TELEPHONE ENCOUNTER
Was the patient seen in the last year in this department? Yes     Does patient have an active prescription for medications requested? No     Received Request Via: Patient     Can you please write out the 3 month Rx's for pharmacy holding.

## 2017-03-21 ENCOUNTER — HOSPITAL ENCOUNTER (OUTPATIENT)
Dept: LAB | Facility: MEDICAL CENTER | Age: 77
End: 2017-03-21
Attending: INTERNAL MEDICINE
Payer: MEDICARE

## 2017-03-21 DIAGNOSIS — I48.91 ATRIAL FIBRILLATION, UNSPECIFIED TYPE (HCC): ICD-10-CM

## 2017-03-21 LAB
ALBUMIN SERPL BCP-MCNC: 4 G/DL (ref 3.2–4.9)
ALP SERPL-CCNC: 91 U/L (ref 30–99)
ALT SERPL-CCNC: 12 U/L (ref 2–50)
ANION GAP SERPL CALC-SCNC: 10 MMOL/L (ref 0–11.9)
AST SERPL-CCNC: 16 U/L (ref 12–45)
BASOPHILS # BLD AUTO: 0.06 K/UL (ref 0–0.12)
BASOPHILS NFR BLD AUTO: 0.6 % (ref 0–1.8)
BILIRUB CONJ SERPL-MCNC: <0.1 MG/DL (ref 0.1–0.5)
BILIRUB INDIRECT SERPL-MCNC: NORMAL MG/DL (ref 0–1)
BILIRUB SERPL-MCNC: 0.7 MG/DL (ref 0.1–1.5)
BUN SERPL-MCNC: 30 MG/DL (ref 8–22)
CALCIUM SERPL-MCNC: 9.2 MG/DL (ref 8.5–10.5)
CHLORIDE SERPL-SCNC: 106 MMOL/L (ref 96–112)
CO2 SERPL-SCNC: 22 MMOL/L (ref 20–33)
CREAT SERPL-MCNC: 0.88 MG/DL (ref 0.5–1.4)
EOSINOPHIL # BLD: 0.07 K/UL (ref 0–0.51)
EOSINOPHIL NFR BLD AUTO: 0.6 % (ref 0–6.9)
ERYTHROCYTE [DISTWIDTH] IN BLOOD BY AUTOMATED COUNT: 50.4 FL (ref 35.9–50)
GLUCOSE SERPL-MCNC: 105 MG/DL (ref 65–99)
HCT VFR BLD AUTO: 44.3 % (ref 37–47)
HGB BLD-MCNC: 14.4 G/DL (ref 12–16)
IMM GRANULOCYTES # BLD AUTO: 0.1 K/UL (ref 0–0.11)
IMM GRANULOCYTES NFR BLD AUTO: 0.9 % (ref 0–0.9)
LYMPHOCYTES # BLD: 0.82 K/UL (ref 1–4.8)
LYMPHOCYTES NFR BLD AUTO: 7.6 % (ref 22–41)
MCH RBC QN AUTO: 34.5 PG (ref 27–33)
MCHC RBC AUTO-ENTMCNC: 32.5 G/DL (ref 33.6–35)
MCV RBC AUTO: 106.2 FL (ref 81.4–97.8)
MONOCYTES # BLD: 0.82 K/UL (ref 0–0.85)
MONOCYTES NFR BLD AUTO: 7.6 % (ref 0–13.4)
NEUTROPHILS # BLD: 8.98 K/UL (ref 2–7.15)
NEUTROPHILS NFR BLD AUTO: 82.7 % (ref 44–72)
NRBC # BLD AUTO: 0 K/UL
NRBC BLD-RTO: 0 /100 WBC
PLATELET # BLD AUTO: 238 K/UL (ref 164–446)
PMV BLD AUTO: 11.6 FL (ref 9–12.9)
POTASSIUM SERPL-SCNC: 4.4 MMOL/L (ref 3.6–5.5)
PROT SERPL-MCNC: 7.1 G/DL (ref 6–8.2)
RBC # BLD AUTO: 4.17 M/UL (ref 4.2–5.4)
SODIUM SERPL-SCNC: 138 MMOL/L (ref 135–145)
WBC # BLD AUTO: 10.9 K/UL (ref 4.8–10.8)

## 2017-03-21 PROCEDURE — 36415 COLL VENOUS BLD VENIPUNCTURE: CPT

## 2017-03-21 PROCEDURE — 80076 HEPATIC FUNCTION PANEL: CPT

## 2017-03-21 PROCEDURE — 80048 BASIC METABOLIC PNL TOTAL CA: CPT

## 2017-03-21 PROCEDURE — 85025 COMPLETE CBC W/AUTO DIFF WBC: CPT

## 2017-03-30 ENCOUNTER — HOSPITAL ENCOUNTER (OUTPATIENT)
Dept: LAB | Facility: MEDICAL CENTER | Age: 77
End: 2017-03-30
Attending: INTERNAL MEDICINE
Payer: MEDICARE

## 2017-03-30 ENCOUNTER — HOSPITAL ENCOUNTER (OUTPATIENT)
Dept: LAB | Facility: MEDICAL CENTER | Age: 77
End: 2017-03-30
Attending: FAMILY MEDICINE
Payer: MEDICARE

## 2017-03-30 LAB
HCYS SERPL-SCNC: 20.01 UMOL/L
T4 FREE SERPL-MCNC: 1.16 NG/DL (ref 0.53–1.43)
TSH SERPL DL<=0.005 MIU/L-ACNC: 0.99 UIU/ML (ref 0.3–3.7)

## 2017-03-30 PROCEDURE — 84443 ASSAY THYROID STIM HORMONE: CPT

## 2017-03-30 PROCEDURE — 36415 COLL VENOUS BLD VENIPUNCTURE: CPT

## 2017-03-30 PROCEDURE — 84439 ASSAY OF FREE THYROXINE: CPT

## 2017-04-01 LAB — METHYLMALONATE SERPL-SCNC: 0.22 UMOL/L (ref 0–0.4)

## 2017-04-06 ENCOUNTER — OFFICE VISIT (OUTPATIENT)
Dept: CARDIOLOGY | Facility: MEDICAL CENTER | Age: 77
End: 2017-04-06
Payer: MEDICARE

## 2017-04-06 VITALS
BODY MASS INDEX: 33.43 KG/M2 | WEIGHT: 208 LBS | HEART RATE: 64 BPM | DIASTOLIC BLOOD PRESSURE: 68 MMHG | HEIGHT: 66 IN | SYSTOLIC BLOOD PRESSURE: 138 MMHG | OXYGEN SATURATION: 95 %

## 2017-04-06 DIAGNOSIS — I48.0 PAF (PAROXYSMAL ATRIAL FIBRILLATION) (HCC): ICD-10-CM

## 2017-04-06 DIAGNOSIS — Z79.891 CHRONIC USE OF OPIATE FOR THERAPEUTIC PURPOSE: ICD-10-CM

## 2017-04-06 DIAGNOSIS — M48.061 LUMBAR STENOSIS: ICD-10-CM

## 2017-04-06 PROCEDURE — 93000 ELECTROCARDIOGRAM COMPLETE: CPT | Performed by: INTERNAL MEDICINE

## 2017-04-06 PROCEDURE — 3288F FALL RISK ASSESSMENT DOCD: CPT | Performed by: INTERNAL MEDICINE

## 2017-04-06 PROCEDURE — 99214 OFFICE O/P EST MOD 30 MIN: CPT | Performed by: INTERNAL MEDICINE

## 2017-04-06 PROCEDURE — 1100F PTFALLS ASSESS-DOCD GE2>/YR: CPT | Performed by: INTERNAL MEDICINE

## 2017-04-06 PROCEDURE — G8419 CALC BMI OUT NRM PARAM NOF/U: HCPCS | Performed by: INTERNAL MEDICINE

## 2017-04-06 PROCEDURE — 1036F TOBACCO NON-USER: CPT | Performed by: INTERNAL MEDICINE

## 2017-04-06 PROCEDURE — 0518F FALL PLAN OF CARE DOCD: CPT | Mod: 8P | Performed by: INTERNAL MEDICINE

## 2017-04-06 PROCEDURE — 4040F PNEUMOC VAC/ADMIN/RCVD: CPT | Performed by: INTERNAL MEDICINE

## 2017-04-06 PROCEDURE — G8432 DEP SCR NOT DOC, RNG: HCPCS | Performed by: INTERNAL MEDICINE

## 2017-04-06 RX ORDER — HYDROCODONE BITARTRATE AND ACETAMINOPHEN 5; 325 MG/1; MG/1
1 TABLET ORAL EVERY 6 HOURS PRN
Qty: 120 TAB | Refills: 0 | Status: SHIPPED | OUTPATIENT
Start: 2017-04-06

## 2017-04-06 ASSESSMENT — ENCOUNTER SYMPTOMS: PALPITATIONS: 1

## 2017-04-06 NOTE — PROGRESS NOTES
Subjective:   Radha Caraballo is a 76 y.o. female who presents today for follow up of a fib on sotalol      Her   in January.  She is handling it well.  A lot of stress.  Having some a fib    bp today looks good.  That is how she runs at home.  A little higher when having a fib.          Past Medical History   Diagnosis Date   • Autoimmune hepatitis (CMS-HCC)    • Heart burn    • Pain      joints,pain scale 5   • Hypertension    • Arthritis    • Indigestion    • Hiatus hernia syndrome    • Unspecified disorder of thyroid      hypothyroid   • CATARACT      surgically corrected   • Arrhythmia    • PAF (paroxysmal atrial fibrillation) (CMS-HCC) 10/21/2015   • Encounter for monitoring sotalol therapy 2017     Past Surgical History   Procedure Laterality Date   • Tonsillectomy     • Hysterectomy, vaginal     • Carpal tunnel endo       right   • Cataract ext w/iol  ,     right   • Appendectomy     • Other  ,      Nathanael Nueromas; right   • Cataract phaco with iol  2009     Performed by NICOLETTE SANTACRUZ at SURGERY SAME DAY [facility]   • Cholecystectomy     • Pr oral surgery procedure     • Liver biopsy  2008,     autoimmune hepatitis   • Retinal detachment repair  , 11/2004     x2 right; x1 left   • Knee arthroplasty total  2011     Performed by SEVERINO KULKARNI at SURGERY [facility]   • Lumbar laminectomy diskectomy Left 2016     Procedure: LUMBAR LAMINECTOMY DISKECTOMY posterior L4-S1;  Surgeon: Jeyson Don M.D.;  Location: SURGERY [facility];  Service:      Family History   Problem Relation Age of Onset   • Hypertension Mother    • Heart Failure Mother    • Hypertension Father    • Heart Failure Father    • Heart Failure Brother 40     CABG   • Heart Failure Brother 50     cabg   • Heart Disease Sister 75     3 stents, valve replacement.     History   Smoking status   • Former Smoker -- 1.00 packs/day for 10 years  "  • Types: Cigarettes   • Quit date: 10/07/1990   Smokeless tobacco   • Former User     Allergies   Allergen Reactions   • Kdc:Fentanyl      States drops the b/p very low  PER PT \"NOT TO BE ADMINISTERED-NOT ANY FENTANYL\"   • Morphine Unspecified     Drops blood pressure   PER PT \"NOT TO BE ADMINISTERED\"   • Ciprofloxacin Vomiting   • Codeine Nausea     \"hyper\"   • Erythromycin Vomiting   • Penicillins Rash   • Sulfa Drugs Vomiting and Nausea     RXN=since age 18     Outpatient Encounter Prescriptions as of 4/6/2017   Medication Sig Dispense Refill   • hydrocodone-acetaminophen (NORCO) 5-325 MG Tab per tablet Take 1 Tab by mouth every 6 hours as needed. 120 Tab 0   • felodipine (PLENDIL) 2.5 MG TABLET SR 24 HR Take one tablet in the morning and two in the evening. 270 Tab 3   • irbesartan (AVAPRO) 150 MG Tab Take 1 Tab by mouth 2 Times a Day. 180 Tab 3   • levothyroxine (SYNTHROID) 100 MCG Tab Take 1 Tab by mouth every morning before breakfast. 90 Tab 1   • predniSONE (DELTASONE) 5 MG Tab Take 1 Tab by mouth every day. 30 Tab 0   • spironolactone (ALDACTONE) 50 MG Tab Take 1 Tab by mouth every day. 90 Tab 3   • sotalol (BETAPACE) 120 MG tablet Take 1 Tab by mouth 2 times a day. 180 Tab 3   • rivaroxaban (XARELTO) 20 MG Tab tablet Take 1 Tab by mouth with dinner. 90 Tab 3   • famotidine (PEPCID) 20 MG Tab Take 20 mg by mouth 2 times a day.     • azathioprine (IMURAN) 50 MG TABS Take 1 Tab by mouth every day. 90 Each 3     No facility-administered encounter medications on file as of 4/6/2017.     Review of Systems   Cardiovascular: Positive for palpitations.        Objective:   /68 mmHg  Pulse 64  Ht 1.676 m (5' 5.98\")  Wt 94.348 kg (208 lb)  BMI 33.59 kg/m2  SpO2 95%  LMP 01/01/1975    Physical Exam   Constitutional: She is oriented to person, place, and time. She appears well-developed and well-nourished. No distress.   HENT:   Head: Normocephalic and atraumatic.   Right Ear: External ear normal.   Left " Ear: External ear normal.   Mouth/Throat: Oropharynx is clear and moist.   Eyes: Conjunctivae and EOM are normal. Right eye exhibits no discharge. Left eye exhibits no discharge. No scleral icterus.   Neck: Normal range of motion. Neck supple. No JVD present. No tracheal deviation present. No thyromegaly present.   Cardiovascular: Normal rate, regular rhythm, normal heart sounds and intact distal pulses.  Exam reveals no gallop and no friction rub.    No murmur heard.  Pulmonary/Chest: Effort normal and breath sounds normal. No stridor. No respiratory distress. She has no wheezes. She has no rales.   Abdominal: Soft. She exhibits no distension.   Musculoskeletal: She exhibits no edema or tenderness.   Neurological: She is alert and oriented to person, place, and time. No cranial nerve deficit. Coordination normal.   Skin: Skin is warm and dry. No rash noted. She is not diaphoretic. No erythema. No pallor.   Psychiatric: She has a normal mood and affect. Her behavior is normal. Judgment and thought content normal.   Vitals reviewed.      Assessment:     1. PAF (paroxysmal atrial fibrillation) (CMS-HCC)  EKG     Cr 0.88  ecg with nsr qtc 440's  Medical Decision Making:  Today's Assessment / Status / Plan:     paf generally well controlled.   Continue sotalol.  qtc 440's.  Continue oac  Consider increased sotalol but she is moving next week so will wait to make any change/

## 2017-04-06 NOTE — MR AVS SNAPSHOT
"        Radha Valentino Cocergine   2017 2:20 PM   Office Visit   MRN: [id number]    Department:  [facility]   Dept Phone:  [phone]    Description:  Female : 1940   Provider:  Jairo Loco M.D.           Reason for Visit     Follow-Up           Allergies as of 2017     Allergen Noted Reactions    Kdc:Fentanyl 2009       States drops the b/p very low  PER PT \"NOT TO BE ADMINISTERED-NOT ANY FENTANYL\"    Morphine 2009   Unspecified    Drops blood pressure   PER PT \"NOT TO BE ADMINISTERED\"    Ciprofloxacin 2015   Vomiting    Codeine 2009   Nausea    \"hyper\"    Erythromycin 2014   Vomiting    Penicillins 2009   Rash    Sulfa Drugs 2011   Vomiting, Nausea    RXN=since age 18      You were diagnosed with     PAF (paroxysmal atrial fibrillation) (CMS-HCC)   [900862]         Vital Signs     Blood Pressure Pulse Height Weight Body Mass Index Oxygen Saturation    138/68 mmHg 64 1.676 m (5' 5.98\") 94.348 kg (208 lb) 33.59 kg/m2 95%    Last Menstrual Period Smoking Status                1975 Former Smoker          Basic Information     Date Of Birth Sex Race Ethnicity Preferred Language    1940 Female White Non- English      Your appointments     2017  2:20 PM   FOLLOW UP with Jairo Loco M.D.   [facility] (--)    [address], [address]  [place] [id number] [phone]            2017 11:00 AM   Established Patient with Andrea Fernández M.D.   [facility] [address]    [address]  [address]  [place] [address] [phone]           You will be receiving a confirmation call a few days before your appointment from our automated call confirmation system.              Problem List              ICD-10-CM Priority Class Noted - Resolved    Edema R60.9 High  11/15/2011 - Present    Hypothyroidism E03.9   11/15/2011 - Present    Hepatitis-Autoimmune K75.9   " 5/22/2012 - Present    Dyslipidemia E78.5   6/7/2012 - Present    Osteoarthritis M19.90   1/9/2015 - Present    Obesity (BMI 30-39.9) E66.9   5/11/2015 - Present    Essential hypertension I10   10/7/2015 - Present    PAF (paroxysmal atrial fibrillation) (CMS-HCC) (Chronic) I48.0   10/21/2015 - Present    Midline low back pain without sciatica M54.5   12/17/2015 - Present    Chronic anticoagulation Z79.01   6/23/2016 - Present    Lumbar stenosis M48.06   7/12/2016 - Present    Encounter for monitoring sotalol therapy (Chronic) Z51.81, Z79.899   2/16/2017 - Present      Health Maintenance        Date Due Completion Dates    PAP SMEAR 6/21/1959 ---    IMM ZOSTER VACCINE 6/21/1998 ---    IMM PNEUMOCOCCAL 65+ (ADULT) LOW/MEDIUM RISK SERIES (2 of 2 - PPSV23) 11/3/2016 11/3/2015    MAMMOGRAM 3/4/2017 3/4/2016, 2/22/2016, 9/18/2014, 8/18/2011, 4/1/2009, 4/1/2009    COLONOSCOPY 9/11/2018 9/11/2008 (Done)    Override on 9/11/2008: Done    BONE DENSITY 2/22/2021 2/22/2016, 8/8/2012, 8/25/2009    IMM DTaP/Tdap/Td Vaccine (2 - Td) 1/22/2026 1/22/2016            Results       Current Immunizations     13-VALENT PCV PREVNAR 11/3/2015  2:20 PM    Hepatitis A Vaccine, Adult 11/5/2008    Hepatitis A Vaccine, Ped/Adol 5/12/2009    Hepatitis B Vaccine Non-Recombivax (Ped/Adol) 5/12/2009    Hepatitis B Vaccine Recombivax (Adol/Adult) 11/5/2008    Influenza TIV (IM) 10/18/2010    Influenza Vaccine Adult HD 10/26/2016, 10/20/2015, 10/13/2014  2:24 PM    Pneumococcal Vaccine (UF)Historical Data 7/18/2007    Tdap Vaccine 1/22/2016      Below and/or attached are the medications your provider expects you to take. Review all of your home medications and newly ordered medications with your provider and/or pharmacist. Follow medication instructions as directed by your provider and/or pharmacist. Please keep your medication list with you and share with your provider. Update the information when medications are discontinued, doses are changed, or  new medications (including over-the-counter products) are added; and carry medication information at all times in the event of emergency situations     Allergies:  KDC:FENTANYL - (reactions not documented)     MORPHINE - Unspecified     CIPROFLOXACIN - Vomiting     CODEINE - Nausea     ERYTHROMYCIN - Vomiting     PENICILLINS - Rash     SULFA DRUGS - Vomiting,Nausea               Medications  Valid as of: April 06, 2017 -  1:56 PM    Generic Name Brand Name Tablet Size Instructions for use    AzaTHIOprine (Tab) IMURAN 50 MG Take 1 Tab by mouth every day.        Famotidine (Tab) PEPCID 20 MG Take 20 mg by mouth 2 times a day.        Felodipine (TABLET SR 24 HR) PLENDIL 2.5 MG Take one tablet in the morning and two in the evening.        Hydrocodone-Acetaminophen (Tab) NORCO 5-325 MG Take 1 Tab by mouth every 6 hours as needed.        Irbesartan (Tab) AVAPRO 150 MG Take 1 Tab by mouth 2 Times a Day.        Levothyroxine Sodium (Tab) SYNTHROID 100 MCG Take 1 Tab by mouth every morning before breakfast.        PredniSONE (Tab) DELTASONE 5 MG Take 1 Tab by mouth every day.        Rivaroxaban (Tab) XARELTO 20 MG Take 1 Tab by mouth with dinner.        Sotalol HCl (Tab) BETAPACE 120 MG Take 1 Tab by mouth 2 times a day.        Spironolactone (Tab) ALDACTONE 50 MG Take 1 Tab by mouth every day.        .                 Medicines prescribed today were sent to:     [id number] - [place], [place] - [address]    [address] [place] [address]    Phone: [phone] Fax: [phone]    Open 24 Hours?: No      Medication refill instructions:       If your prescription bottle indicates you have medication refills left, it is not necessary to call your provider’s office. Please contact your pharmacy and they will refill your medication.    If your prescription bottle indicates you do not have any refills left, you may request refills at any time through one of the following ways: The online [employer] system (except  Urgent Care), by calling your provider’s office, or by asking your pharmacy to contact your provider’s office with a refill request. Medication refills are processed only during regular business hours and may not be available until the next business day. Your provider may request additional information or to have a follow-up visit with you prior to refilling your medication.   *Please Note: Medication refills are assigned a new Rx number when refilled electronically. Your pharmacy may indicate that no refills were authorized even though a new prescription for the same medication is available at the pharmacy. Please request the medicine by name with the pharmacy before contacting your provider for a refill.           MyChart Status: Patient Declined

## 2017-04-08 LAB — EKG IMPRESSION: NORMAL

## 2017-04-27 ENCOUNTER — TELEPHONE (OUTPATIENT)
Dept: MEDICAL GROUP | Facility: MEDICAL CENTER | Age: 77
End: 2017-04-27

## 2017-04-27 NOTE — TELEPHONE ENCOUNTER
Future Appointments      Provider Department Center   4/28/2017 11:00 AM Andrea Fernández M.D. [facility] [address]     ESTABLISHED PATIENT PRE-VISIT PLANNING     Note: Patient will not be contacted if there is no indication to call. was not Contacted.    1.  Reviewed note from last office visit with PCP and/or other med group provider: Yes    2.  If any orders were placed at last visit, do we have Results/Consult Notes?        •  Labs - Labs ordered, completed and results are in chart.       •  Imaging - Imaging was not ordered at last office visit.       •  Referrals - No referrals were ordered at last office visit.    3.  Immunizations were updated in Epic using WebIZ?: Epic matches WebIZ       •  Web Iz Recommendations: HEPATITIS B PNEUMOVAX (PPSV23) ZOSTAVAX (Shingles)    4.  Patient is due for the following Health Maintenance Topics:   Health Maintenance Due   Topic Date Due   • Annual Wellness Visit  DUE   • IMM ZOSTER VACCINE  DUE   • IMM PNEUMOVAX 23 DUE   • MAMMOGRAM  DUE       5.  Patient was not informed to arrive 15 min prior to their scheduled appointment and bring in their medication bottles.

## 2017-05-10 ENCOUNTER — TELEPHONE (OUTPATIENT)
Dept: VASCULAR LAB | Facility: MEDICAL CENTER | Age: 77
End: 2017-05-10

## 2017-06-08 ENCOUNTER — PATIENT OUTREACH (OUTPATIENT)
Dept: HEALTH INFORMATION MANAGEMENT | Facility: OTHER | Age: 77
End: 2017-06-08

## 2017-06-08 NOTE — PROGRESS NOTES
Outcome: NO ANSWER    WebIZ Checked & Epic Updated:  yes    [facility] Verified: yes    Attempt # 1

## 2017-06-14 ENCOUNTER — ANTICOAGULATION MONITORING (OUTPATIENT)
Dept: VASCULAR LAB | Facility: MEDICAL CENTER | Age: 77
End: 2017-06-14

## 2017-06-14 DIAGNOSIS — Z79.01 CHRONIC ANTICOAGULATION: ICD-10-CM

## 2017-06-14 DIAGNOSIS — I48.0 PAF (PAROXYSMAL ATRIAL FIBRILLATION) (HCC): ICD-10-CM

## 2017-06-20 NOTE — PROGRESS NOTES
Attempt #:2    WebIZ Checked & Epic Updated: yes  [facility] Verified: yes  Verify PCP: no    Communication Preference Obtained: yes     Review Care Team: yes    Annual Wellness Visit Scheduling  1. Scheduling Status:Not Scheduled. Patient states they are no longer with PCP     PT MOVED OUT OF AREA NO LONGER WITH [facility]-HAS NON [facility] PCP        MyChart Activation: declined  MyChart Dereck: no  Virtual Visits: no  Opt In to Text Messages: no

## 2021-01-14 DIAGNOSIS — Z23 NEED FOR VACCINATION: ICD-10-CM

## 2024-08-01 ENCOUNTER — TELEPHONE (OUTPATIENT)
Dept: HEALTH INFORMATION MANAGEMENT | Facility: OTHER | Age: 84
End: 2024-08-01